# Patient Record
(demographics unavailable — no encounter records)

---

## 2025-07-25 NOTE — HISTORY OF PRESENT ILLNESS
[Hearing Loss] : hearing loss [de-identified] : 18 year old with constant intermittent hearing loss for a year and a half. Pt has hx of tubes twice when she was young. Pt had hx of cleft palate. Right is better than the left. Pt went to ent a month ago and had a myringotomy.  [Ear Fullness] : no ear fullness [Tinnitus] : no tinnitus [Vertigo] : no vertigo